# Patient Record
Sex: MALE | Race: WHITE | NOT HISPANIC OR LATINO | Employment: FULL TIME | URBAN - METROPOLITAN AREA
[De-identification: names, ages, dates, MRNs, and addresses within clinical notes are randomized per-mention and may not be internally consistent; named-entity substitution may affect disease eponyms.]

---

## 2017-12-28 ENCOUNTER — OFFICE VISIT (OUTPATIENT)
Dept: URGENT CARE | Facility: CLINIC | Age: 37
End: 2017-12-28

## 2017-12-28 PROCEDURE — 99203 OFFICE O/P NEW LOW 30 MIN: CPT

## 2018-01-01 NOTE — PROGRESS NOTES
Assessment   1  Dental infection (522 4) (K04 7)    Plan   Dental infection    · Amoxicillin 875 MG Oral Tablet; take 1 tablet every twelve hours   · Ibuprofen 800 MG Oral Tablet; TAKE 1 TABLET 4 TIMES DAILY AS NEEDED    Discussion/Summary   Discussion Summary:    Follow up with dentist     Medication Side Effects Reviewed: Possible side effects of new medications were reviewed with the patient/guardian today  Understands and agrees with treatment plan: The treatment plan was reviewed with the patient/guardian  The patient/guardian understands and agrees with the treatment plan    Counseling Documentation With Imm: The patient was counseled regarding instructions for management,-- patient and family education,-- importance of compliance with treatment  Chief Complaint   1  Gum Symptoms  Chief Complaint Free Text Note Form: C/O pain in right upper back tooth x 3 days  Pt has not seen a dentist in years  History of Present Illness   HPI: Started with right upper dental pain 3 days ago  Hospital Based Practices Required Assessment:      Pain Assessment      the patient states they have pain  The pain is located in the right upper back tooth  The patient describes the pain as aching and throbbing  (on a scale of 0 to 10, the patient rates the pain at 6 )      Abuse And Domestic Violence Screen       Yes, the patient is safe at home  -- The patient states no one is hurting them  Depression And Suicide Screen  No, the patient has not had thoughts of hurting themself  No, the patient has not felt depressed in the past 7 days  Active Problems   1  History of kidney stones (V13 01) (Z32 069)    Past Medical History   1  No pertinent past medical history  Active Problems And Past Medical History Reviewed: The active problems and past medical history were reviewed and updated today        Social History    · Occasional alcohol use   · Occasional tobacco smoker (305 1) (Z72 0)  Social History Reviewed: The social history was reviewed and updated today  Surgical History   1  Denied: History Of Prior Surgery    Current Meds    1  No Reported Medications Recorded  Medication List Reviewed: The medication list was reviewed and updated today  Allergies   1  No Known Drug Allergies    Vitals   Signs   Recorded: [id number] 10:24AM   Temperature: 97 6 F  Heart Rate: 100  Respiration: 16  Systolic: 599  Diastolic: 90  Height: 6 ft 1 in  Weight: 275 lb   BMI Calculated: 36 28  BSA Calculated: 2 46  O2 Saturation: 99  Pain Scale: 5    Physical Exam        Eyes      Conjunctiva and lids: No swelling, erythema, or discharge  Ears, Nose, Mouth, and Throat      External inspection of ears and nose: Normal  -- Right upper last molar with large cavity in lateral aspect at gumline, mild gingival erythema, no drainage  Pulmonary      Respiratory effort: No increased work of breathing or signs of respiratory distress  Lymphatic      Palpation of lymph nodes in neck: No lymphadenopathy  Musculoskeletal      Gait and station: Normal        Skin      Skin and subcutaneous tissue: Normal without rashes or lesions         Psychiatric      Mood and affect: Normal        Signatures    Electronically signed by : DANISHA Mojica; Dec 28 2017 10:34AM EST                       (Author)     Electronically signed by : MICAELA Song ; Dec 31 2017  2:33PM EST                       (Co-author)

## 2018-01-23 VITALS
BODY MASS INDEX: 36.45 KG/M2 | HEIGHT: 73 IN | DIASTOLIC BLOOD PRESSURE: 90 MMHG | WEIGHT: 275 LBS | TEMPERATURE: 97.6 F | SYSTOLIC BLOOD PRESSURE: 122 MMHG | HEART RATE: 100 BPM | RESPIRATION RATE: 16 BRPM | OXYGEN SATURATION: 99 %

## 2020-06-12 ENCOUNTER — OFFICE VISIT (OUTPATIENT)
Dept: URGENT CARE | Facility: CLINIC | Age: 40
End: 2020-06-12

## 2020-06-12 VITALS
OXYGEN SATURATION: 99 % | DIASTOLIC BLOOD PRESSURE: 83 MMHG | BODY MASS INDEX: 36.18 KG/M2 | TEMPERATURE: 97.1 F | HEIGHT: 73 IN | RESPIRATION RATE: 18 BRPM | WEIGHT: 273 LBS | SYSTOLIC BLOOD PRESSURE: 145 MMHG | HEART RATE: 73 BPM

## 2020-06-12 DIAGNOSIS — R10.13 EPIGASTRIC PAIN: Primary | ICD-10-CM

## 2020-06-12 DIAGNOSIS — R11.2 NAUSEA AND VOMITING, INTRACTABILITY OF VOMITING NOT SPECIFIED, UNSPECIFIED VOMITING TYPE: ICD-10-CM

## 2020-06-12 PROCEDURE — G0382 LEV 3 HOSP TYPE B ED VISIT: HCPCS | Performed by: PHYSICIAN ASSISTANT

## 2020-06-12 RX ORDER — OMEPRAZOLE 20 MG/1
40 CAPSULE, DELAYED RELEASE ORAL DAILY
Qty: 30 CAPSULE | Refills: 0 | Status: SHIPPED | OUTPATIENT
Start: 2020-06-12

## 2020-06-12 RX ORDER — CALCIUM CARBONATE 200(500)MG
1 TABLET,CHEWABLE ORAL DAILY
COMMUNITY

## 2021-04-07 ENCOUNTER — OFFICE VISIT (OUTPATIENT)
Dept: URGENT CARE | Facility: CLINIC | Age: 41
End: 2021-04-07

## 2021-04-07 VITALS
SYSTOLIC BLOOD PRESSURE: 142 MMHG | RESPIRATION RATE: 20 BRPM | WEIGHT: 271 LBS | DIASTOLIC BLOOD PRESSURE: 80 MMHG | BODY MASS INDEX: 35.92 KG/M2 | HEIGHT: 73 IN | TEMPERATURE: 98.3 F | OXYGEN SATURATION: 98 % | HEART RATE: 86 BPM

## 2021-04-07 DIAGNOSIS — U07.1 COVID-19: Primary | ICD-10-CM

## 2021-04-07 PROCEDURE — 99213 OFFICE O/P EST LOW 20 MIN: CPT | Performed by: PHYSICIAN ASSISTANT

## 2021-04-07 NOTE — LETTER
April 7, 2021     Patient: Chinedu Mcpherson   YOB: 1980   Date of Visit: 4/7/2021       To Whom It May Concern: It is my medical opinion that Chinedu Mcpherson may return to work with no restrictions  If you have any questions or concerns, please don't hesitate to call           Sincerely,        Kimberly Rodríguez PA-C    CC: Chinedu Oquendolanette no chest pain, no cough, and no shortness of breath.

## 2021-04-07 NOTE — PROGRESS NOTES
[facility]    NAME: Chinedu Mcpherson is a 37 y o  male  : 1980    MRN: [id number]  DATE: 2021  TIME: 11:11 AM    Assessment and Plan   COVID-19 [U07 1]  1  COVID-19         Patient Instructions     Patient Instructions   Recommend follow up with pcp  Patient can end quarantine, symptoms started >10 days ago      Chief Complaint     Chief Complaint   Patient presents with    Headache     Foggy x 1 day        History of Present Illness     28-year-old male here for a note to go back to work  Patient states that he had COVID and had tested positive  Patient had a symptoms of headache, haziness and feeling off on 2021  On 2021 he decided to get tested for COVID due to coworkers being positive  He went to [facility] for a test over-the-counter at which was positive  Patient was able to show me the test results in his portal   He has not had any symptoms since the 1st 2 days of not feeling well  Denies any shortness of breath  States that he is feeling well  He currently does not have a PCP  Review of Systems   Review of Systems   Constitutional: Negative for chills, fatigue and fever  HENT: Negative for congestion, ear pain, postnasal drip, sinus pressure and sore throat  Respiratory: Negative for cough, shortness of breath and wheezing  Neurological: Negative for headaches  All other systems reviewed and are negative        Current Medications     Current Outpatient Medications:     calcium carbonate (TUMS) 500 mg chewable tablet, Chew 1 tablet daily, Disp: , Rfl:     omeprazole (PriLOSEC) 20 mg delayed release capsule, Take 2 capsules (40 mg total) by mouth daily, Disp: 30 capsule, Rfl: 0    Current Allergies     Allergies as of 2021    (No Known Allergies)          The following portions of the patient's history were reviewed and updated as appropriate: allergies, current medications, past family history, past medical history, past social history, past surgical history and problem list    Past Medical History:   Diagnosis Date    Gastro-esophageal reflux      History reviewed  No pertinent surgical history  Family History   Problem Relation Age of Onset    Diabetes Mother     Hypertension Mother     Diabetes Father      Social History     Socioeconomic History    Marital status: Single     Spouse name: Not on file    Number of children: Not on file    Years of education: Not on file    Highest education level: Not on file   Occupational History    Not on file   Social Needs    Financial resource strain: Not on file    Food insecurity     Worry: Not on file     Inability: Not on file    Transportation needs     Medical: Not on file     Non-medical: Not on file   Tobacco Use    Smoking status: Current Some Day Smoker     Types: Cigarettes    Smokeless tobacco: Never Used   Substance and Sexual Activity    Alcohol use: Yes     Frequency: Monthly or less    Drug use: Yes     Types: Marijuana    Sexual activity: Not on file   Lifestyle    Physical activity     Days per week: Not on file     Minutes per session: Not on file    Stress: Not on file   Relationships    Social connections     Talks on phone: Not on file     Gets together: Not on file     Attends Christian service: Not on file     Active member of club or organization: Not on file     Attends meetings of clubs or organizations: Not on file     Relationship status: Not on file    Intimate partner violence     Fear of current or ex partner: Not on file     Emotionally abused: Not on file     Physically abused: Not on file     Forced sexual activity: Not on file   Other Topics Concern    Not on file   Social History Narrative    Not on file     Medications have been verified  Objective   /80   Pulse 86   Temp 98 3 °F (36 8 °C)   Resp 20   Ht 6' 1" (1 854 m)   Wt 123 kg (271 lb)   SpO2 98%   BMI 35 75 kg/m²      Physical Exam   Physical Exam  Vitals signs reviewed  Constitutional:       General: He is not in acute distress  Appearance: He is well-developed  HENT:      Head: Normocephalic and atraumatic  Right Ear: Tympanic membrane normal       Left Ear: Tympanic membrane normal       Nose: No mucosal edema  Cardiovascular:      Rate and Rhythm: Normal rate and regular rhythm  Heart sounds: Normal heart sounds  Pulmonary:      Effort: Pulmonary effort is normal  No respiratory distress  Breath sounds: Normal breath sounds